# Patient Record
Sex: FEMALE | Race: WHITE | NOT HISPANIC OR LATINO | Employment: STUDENT | URBAN - METROPOLITAN AREA
[De-identification: names, ages, dates, MRNs, and addresses within clinical notes are randomized per-mention and may not be internally consistent; named-entity substitution may affect disease eponyms.]

---
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
## 2025-01-11 ENCOUNTER — HOSPITAL ENCOUNTER (EMERGENCY)
Facility: HOSPITAL | Age: 20
Discharge: HOME | End: 2025-01-11
Attending: EMERGENCY MEDICINE
Payer: COMMERCIAL

## 2025-01-11 VITALS
SYSTOLIC BLOOD PRESSURE: 185 MMHG | TEMPERATURE: 97.7 F | BODY MASS INDEX: 41.83 KG/M2 | OXYGEN SATURATION: 99 % | DIASTOLIC BLOOD PRESSURE: 89 MMHG | RESPIRATION RATE: 22 BRPM | HEIGHT: 63 IN | HEART RATE: 109 BPM

## 2025-01-11 DIAGNOSIS — H66.92 LEFT OTITIS MEDIA: ICD-10-CM

## 2025-01-11 DIAGNOSIS — R09.81 NASAL CONGESTION: ICD-10-CM

## 2025-01-11 DIAGNOSIS — J06.9 UPPER RESPIRATORY INFECTION: Primary | ICD-10-CM

## 2025-01-11 LAB
FLUAV RNA RESP QL NAA+PROBE: NEGATIVE
FLUBV RNA RESP QL NAA+PROBE: NEGATIVE
RSV RNA RESP QL NAA+PROBE: NEGATIVE
SARS-COV-2 RNA RESP QL NAA+PROBE: NEGATIVE

## 2025-01-11 PROCEDURE — 99284 EMERGENCY DEPT VISIT MOD MDM: CPT

## 2025-01-11 PROCEDURE — 0241U HB NFCT DS VIR RESP RNA 4 TRGT: CPT | Performed by: EMERGENCY MEDICINE

## 2025-01-11 PROCEDURE — 99284 EMERGENCY DEPT VISIT MOD MDM: CPT | Performed by: EMERGENCY MEDICINE

## 2025-01-11 RX ORDER — AMOXICILLIN 500 MG/1
500 CAPSULE ORAL 3 TIMES DAILY
Qty: 21 CAPSULE | Refills: 0 | Status: SHIPPED | OUTPATIENT
Start: 2025-01-11 | End: 2025-01-11

## 2025-01-11 RX ORDER — AMOXICILLIN 500 MG/1
500 CAPSULE ORAL 3 TIMES DAILY
Qty: 21 CAPSULE | Refills: 0 | Status: SHIPPED | OUTPATIENT
Start: 2025-01-11 | End: 2025-01-21

## 2025-01-11 RX ORDER — AMOXICILLIN 500 MG/1
500 CAPSULE ORAL ONCE
Status: COMPLETED | OUTPATIENT
Start: 2025-01-11 | End: 2025-01-11

## 2025-01-11 RX ADMIN — AMOXICILLIN 500 MG: 500 CAPSULE ORAL at 23:10

## 2025-01-12 NOTE — ED PROVIDER NOTES
"Time reflects when diagnosis was documented in both MDM as applicable and the Disposition within this note       Time User Action Codes Description Comment    1/11/2025 10:53 PM George Laura [J06.9] Upper respiratory infection     1/11/2025 10:53 PM George Laura [R09.81] Nasal congestion     1/11/2025 10:53 PM George Laura Add [H66.92] Left otitis media           ED Disposition       ED Disposition   Discharge    Condition   Stable    Date/Time   Sat Jan 11, 2025 10:53 PM    Comment   Puja Miramontes discharge to home/self care.                   Assessment & Plan       Medical Decision Making  Risk  Prescription drug management.      Upper respiratory infection, left-sided otitis media, first dose of antibiotics prescribed prior to discharge, otherwise patient is nontoxic-appearing, stable for discharge to home.    Portions of the record may have been created with voice recognition software. Occasional wrong word or \"sound a like\" substitutions may have occurred due to the inherent limitations of voice recognition software. Read the chart carefully and recognize, using context, where substitutions have occurred.     ED Course as of 01/12/25 0002   Sat Jan 11, 2025   7656 Patient seen, evaluated, examined, previously healthy 20-year-old female, sinus congestion, runny nose, left ear pain, + family members sick.    Brief focused differential diagnosis in this patient is as follows: Sinusitis versus left-sided otitis media versus flu versus RSV versus COVID.  Child that is currently with the patient is a months old placed high risk for viral infection specifically RSV therefore we will do the full viral panel.  Lungs are clear to auscultation no clinical indication for chest x-ray.       Medications   amoxicillin (AMOXIL) capsule 500 mg (500 mg Oral Given 1/11/25 2310)       ED Risk Strat Scores            CRAFFT      Flowsheet Row Most Recent Value   CRAFFT Initial Screen: During the past 12 months, did " "you:    1. Drink any alcohol (more than a few sips)?  No Filed at: 01/11/2025 2124   2. Smoke any marijuana or hashish No Filed at: 01/11/2025 2124   3. Use anything else to get high? (\"anything else\" includes illegal drugs, over the counter and prescription drugs, and things that you sniff or 'eldridge')? No Filed at: 01/11/2025 2124                                          History of Present Illness       Chief Complaint   Patient presents with    Nasal Congestion     Started about 2 days ago. Started with a sore throat, now having headache, ear pain, and congestion. No nausea or vomiting.        Past Medical History:   Diagnosis Date    Anxiety     Depression     Obesity       History reviewed. No pertinent surgical history.   Family History   Problem Relation Age of Onset    Arthritis Mother     No Known Problems Father       Social History     Tobacco Use    Smoking status: Never     Passive exposure: Never    Smokeless tobacco: Never   Vaping Use    Vaping status: Never Used   Substance Use Topics    Alcohol use: Never    Drug use: Never      E-Cigarette/Vaping    E-Cigarette Use Never User       E-Cigarette/Vaping Substances    Nicotine No     THC No     CBD No     Flavoring No     Other No     Unknown No       I have reviewed and agree with the history as documented.     HPI    20-year-old female, presents emergency department with her son and her mother, her son has similar symptoms, the patient reports a 4-day history of cough, congestion, sinus head cold symptoms without a productive cough, without a documented fever without rash.  Patient reports left ear pain, recent barotrauma, no recent travel to the geographical area, no vomiting, no abdominal pain.  Is visiting here from [place] area as her son there is no recent sick contacts.  History of melena, hematochezia or hemoptysis.    Remaining 12 point review of systems is unremarkable.  Review of Systems   Constitutional:  Positive for fever. Negative " for chills and fatigue.   HENT:  Positive for ear pain. Negative for congestion, dental problem, drooling, ear discharge, sinus pressure and trouble swallowing.    Eyes: Negative.  Negative for photophobia, pain and redness.   Respiratory:  Positive for cough and shortness of breath. Negative for chest tightness.    Cardiovascular: Negative.  Negative for chest pain and leg swelling.   Gastrointestinal: Negative.  Negative for abdominal pain, diarrhea and vomiting.   Endocrine: Negative.    Genitourinary: Negative.  Negative for flank pain and hematuria.   Musculoskeletal: Negative.  Negative for back pain.   Skin: Negative.    Allergic/Immunologic: Negative.    Neurological: Negative.    Hematological: Negative.    Psychiatric/Behavioral: Negative.             Objective       ED Triage Vitals [01/11/25 2129]   Temperature Pulse Blood Pressure Respirations SpO2 Patient Position - Orthostatic VS   97.7 °F (36.5 °C) (!) 109 (!) 205/114 22 99 % Sitting      Temp src Heart Rate Source BP Location FiO2 (%) Pain Score    -- Monitor Left arm -- 3      Vitals      Date and Time Temp Pulse SpO2 Resp BP Pain Score FACES Pain Rating User   01/11/25 2200 -- -- -- -- -- 3 -- EZ   01/11/25 2131 -- -- -- -- 185/89 -- -- KL   01/11/25 2129 97.7 °F (36.5 °C) 109 99 % 22 205/114 3 -- KL            Physical Exam  Vitals and nursing note reviewed.   Constitutional:       General: She is not in acute distress.     Appearance: Normal appearance. She is normal weight. She is not ill-appearing, toxic-appearing or diaphoretic.   HENT:      Head: Normocephalic and atraumatic.      Right Ear: External ear normal.      Left Ear: External ear normal.      Nose: Nose normal.      Mouth/Throat:      Mouth: Mucous membranes are moist.      Pharynx: Oropharynx is clear.   Eyes:      Extraocular Movements: Extraocular movements intact.      Conjunctiva/sclera: Conjunctivae normal.      Pupils: Pupils are equal, round, and reactive to light.    Cardiovascular:      Rate and Rhythm: Normal rate and regular rhythm.      Pulses: Normal pulses.      Heart sounds: Normal heart sounds.   Pulmonary:      Effort: Pulmonary effort is normal. No respiratory distress.      Breath sounds: Normal breath sounds. No stridor. No wheezing, rhonchi or rales.   Chest:      Chest wall: No tenderness.   Abdominal:      General: Abdomen is flat. Bowel sounds are normal. There is no distension.      Palpations: Abdomen is soft. There is no mass.   Musculoskeletal:         General: Normal range of motion.      Cervical back: Normal range of motion.   Skin:     General: Skin is warm.      Capillary Refill: Capillary refill takes less than 2 seconds.   Neurological:      General: No focal deficit present.      Mental Status: She is alert and oriented to person, place, and time. Mental status is at baseline.   Psychiatric:         Mood and Affect: Mood normal.         Behavior: Behavior normal.         Thought Content: Thought content normal.         Judgment: Judgment normal.         Results Reviewed       Procedure Component Value Units Date/Time    FLU/RSV/COVID - if FLU/RSV clinically relevant (2hr TAT) [[id number]]  (Normal) Collected: 01/11/25 2207    Lab Status: Final result Specimen: Nares from Nose Updated: 01/11/25 2250     SARS-CoV-2 Negative     INFLUENZA A PCR Negative     INFLUENZA B PCR Negative     RSV PCR Negative    Narrative:      This test has been performed using the CoV-2/Flu/RSV plus assay on the [employer] GeneXpert platform. This test has been validated by the  and verified by the performing laboratory.     This test is designed to amplify and detect the following: nucleocapsid (N), envelope (E), and RNA-dependent RNA polymerase (RdRP) genes of the SARS-CoV-2 genome; matrix (M), basic polymerase (PB2), and acidic protein (PA) segments of the influenza A genome; matrix (M) and non-structural protein (NS) segments of the influenza B genome, and the  nucleocapsid genes of RSV A and RSV B.     Positive results are indicative of the presence of Flu A, Flu B, RSV, and/or SARS-CoV-2 RNA. Positive results for SARS-CoV-2 or suspected novel influenza should be reported to state, local, or federal health departments according to local reporting requirements.      All results should be assessed in conjunction with clinical presentation and other laboratory markers for clinical management.     FOR PEDIATRIC PATIENTS - copy/paste COVID Guidelines URL to browser: [email or web address]               No orders to display       Procedures    ED Medication and Procedure Management   Prior to Admission Medications   Prescriptions Last Dose Informant Patient Reported? Taking?   levothyroxine 25 mcg tablet   No No   Sig: Take 1 tablet (25 mcg total) by mouth daily      Facility-Administered Medications: None     Discharge Medication List as of 1/11/2025 11:03 PM        CONTINUE these medications which have CHANGED    Details   amoxicillin (AMOXIL) 500 mg capsule Take 1 capsule (500 mg total) by mouth 3 (three) times a day for 10 days, Starting Sat 1/11/2025, Until Tue 1/21/2025, Normal           CONTINUE these medications which have NOT CHANGED    Details   levothyroxine 25 mcg tablet Take 1 tablet (25 mcg total) by mouth daily, Starting Wed 3/27/2024, Normal           No discharge procedures on file.  ED SEPSIS DOCUMENTATION   Time reflects when diagnosis was documented in both MDM as applicable and the Disposition within this note       Time User Action Codes Description Comment    1/11/2025 10:53 PM George Laura [J06.9] Upper respiratory infection     1/11/2025 10:53 PM George Laura [R09.81] Nasal congestion     1/11/2025 10:53 PM George Laura [H66.92] Left otitis media                  George Laura III, DO  01/12/25 0002